# Patient Record
Sex: MALE | Race: WHITE | Employment: FULL TIME | URBAN - METROPOLITAN AREA
[De-identification: names, ages, dates, MRNs, and addresses within clinical notes are randomized per-mention and may not be internally consistent; named-entity substitution may affect disease eponyms.]

---

## 2024-04-24 ENCOUNTER — OFFICE VISIT (OUTPATIENT)
Age: 54
End: 2024-04-24

## 2024-04-24 VITALS
DIASTOLIC BLOOD PRESSURE: 74 MMHG | WEIGHT: 230 LBS | HEIGHT: 76 IN | BODY MASS INDEX: 28.01 KG/M2 | OXYGEN SATURATION: 98 % | SYSTOLIC BLOOD PRESSURE: 110 MMHG | HEART RATE: 98 BPM | TEMPERATURE: 98.1 F

## 2024-04-24 DIAGNOSIS — J01.10 ACUTE NON-RECURRENT FRONTAL SINUSITIS: Primary | ICD-10-CM

## 2024-04-24 RX ORDER — AMOXICILLIN AND CLAVULANATE POTASSIUM 875; 125 MG/1; MG/1
1 TABLET, FILM COATED ORAL 2 TIMES DAILY
Qty: 14 TABLET | Refills: 0 | Status: SHIPPED | OUTPATIENT
Start: 2024-04-24 | End: 2024-05-01

## 2024-04-24 RX ORDER — FLUTICASONE PROPIONATE 50 MCG
2 SPRAY, SUSPENSION (ML) NASAL DAILY
Qty: 16 G | Refills: 0 | Status: SHIPPED | OUTPATIENT
Start: 2024-04-24

## 2024-04-24 ASSESSMENT — ENCOUNTER SYMPTOMS
RHINORRHEA: 1
COUGH: 1
VOMITING: 0
SINUS PAIN: 1
ABDOMINAL PAIN: 0
DIARRHEA: 0
SORE THROAT: 1

## 2024-04-24 NOTE — PATIENT INSTRUCTIONS
New Prescriptions    AMOXICILLIN-CLAVULANATE (AUGMENTIN) 875-125 MG PER TABLET    Take 1 tablet by mouth 2 times daily for 7 days    FLUTICASONE (FLONASE) 50 MCG/ACT NASAL SPRAY    2 sprays by Each Nostril route daily      Take antibiotic as prescribed.  Use nasal spray as directed for symptom relief.  You can continue to use mucinex for symptom relief.  Encourage rest and increase fluid intake.  Follow-up with your PCP as needed.  Return for severe/worsening symptoms.

## 2024-04-24 NOTE — PROGRESS NOTES
Miko Landa (:  1970) is a 53 y.o. male,New patient, here for evaluation of the following chief complaint(s):  Sinusitis (Head and chest congestion, phlegm, chills and hot flashes. Symptoms x 3 days.)      Assessment & Plan :  Visit Diagnoses and Associated Orders       Acute non-recurrent frontal sinusitis    -  Primary    amoxicillin-clavulanate (AUGMENTIN) 875-125 MG per tablet [05856]      fluticasone (FLONASE) 50 MCG/ACT nasal spray [53770]                 Differential diagnoses: sinusitis, allergic rhinitis, common cold, influenza, covid, pneumonia    Plan: Most likely has acute sinusitis. Will be treated with augmentin for the infection. Also given Flonase for symptom relief. Advised to continue to take OTC mucinex and tylenol and/or ibuprofen for symptom relief and to follow-up with PCP as needed. Return precautions discussed. Follow up in 3 days if symptoms persist or if symptoms worsen.       Subjective :  HPI  Miko Landa is a 53 y.o. male who presents with complaints of sinus pain, pressure, and a cough. He reports that he started with symptoms on  and has been getting progressively worse over the last few days. He states that he has had a productive cough and has been coughing up green sputum. He has also noted green nasal discharge. He states that he has had a mild sore throat as well and mild bilateral ear pressure. He states that he has been taking mucinex and advil cold and sinus with moderate improvement in his symptoms. He denies fevers but has noted sweats and chills. He denies any known sick contacts. He has noted some wheezing as well when coughing.       Sinusitis  Associated symptoms include chills, congestion, coughing, sinus pressure and a sore throat. Pertinent negatives include no ear pain or headaches.       Vitals:    24 0810   BP: 110/74   Pulse: 98   Temp: 98.1 °F (36.7 °C)   TempSrc: Oral   SpO2: 98%   Weight: 104.3 kg (230 lb)   Height: